# Patient Record
Sex: FEMALE | Race: WHITE | NOT HISPANIC OR LATINO | URBAN - METROPOLITAN AREA
[De-identification: names, ages, dates, MRNs, and addresses within clinical notes are randomized per-mention and may not be internally consistent; named-entity substitution may affect disease eponyms.]

---

## 2021-02-13 DIAGNOSIS — Z23 ENCOUNTER FOR IMMUNIZATION: ICD-10-CM

## 2021-02-17 ENCOUNTER — IMMUNIZATIONS (OUTPATIENT)
Dept: FAMILY MEDICINE CLINIC | Facility: HOSPITAL | Age: 76
End: 2021-02-17

## 2021-02-17 DIAGNOSIS — Z23 ENCOUNTER FOR IMMUNIZATION: Primary | ICD-10-CM

## 2021-02-17 PROCEDURE — 91300 SARS-COV-2 / COVID-19 MRNA VACCINE (PFIZER-BIONTECH) 30 MCG: CPT

## 2021-02-17 PROCEDURE — 0001A SARS-COV-2 / COVID-19 MRNA VACCINE (PFIZER-BIONTECH) 30 MCG: CPT

## 2021-03-10 ENCOUNTER — IMMUNIZATIONS (OUTPATIENT)
Dept: FAMILY MEDICINE CLINIC | Facility: HOSPITAL | Age: 76
End: 2021-03-10

## 2021-03-10 DIAGNOSIS — Z23 ENCOUNTER FOR IMMUNIZATION: Primary | ICD-10-CM

## 2021-03-10 PROCEDURE — 91300 SARS-COV-2 / COVID-19 MRNA VACCINE (PFIZER-BIONTECH) 30 MCG: CPT

## 2021-03-10 PROCEDURE — 0002A SARS-COV-2 / COVID-19 MRNA VACCINE (PFIZER-BIONTECH) 30 MCG: CPT

## 2022-12-26 ENCOUNTER — OFFICE VISIT (OUTPATIENT)
Dept: URGENT CARE | Facility: CLINIC | Age: 77
End: 2022-12-26

## 2022-12-26 VITALS — OXYGEN SATURATION: 98 % | HEART RATE: 91 BPM | RESPIRATION RATE: 20 BRPM | TEMPERATURE: 97.9 F

## 2022-12-26 DIAGNOSIS — H10.13 ALLERGIC CONJUNCTIVITIS OF BOTH EYES: Primary | ICD-10-CM

## 2022-12-26 RX ORDER — KETOTIFEN FUMARATE 0.35 MG/ML
1 SOLUTION/ DROPS OPHTHALMIC 2 TIMES DAILY
Qty: 5 ML | Refills: 0 | Status: SHIPPED | OUTPATIENT
Start: 2022-12-26

## 2022-12-26 RX ORDER — ALLOPURINOL 100 MG/1
100 TABLET ORAL 2 TIMES DAILY
COMMUNITY
Start: 2022-12-02 | End: 2023-12-02

## 2022-12-26 RX ORDER — COLCHICINE 0.6 MG/1
0.6 TABLET ORAL
COMMUNITY
Start: 2022-12-02 | End: 2023-12-02

## 2022-12-26 RX ORDER — ROSUVASTATIN CALCIUM 20 MG/1
20 TABLET, COATED ORAL DAILY
COMMUNITY

## 2022-12-26 RX ORDER — FERROUS SULFATE 325(65) MG
1 TABLET ORAL
COMMUNITY
Start: 2022-08-02 | End: 2023-08-02

## 2022-12-26 RX ORDER — DIMENHYDRINATE 50 MG
TABLET ORAL
COMMUNITY

## 2022-12-26 RX ORDER — ASPIRIN 81 MG/1
81 TABLET ORAL DAILY
COMMUNITY

## 2022-12-26 RX ORDER — LEVOTHYROXINE SODIUM 112 UG/1
112 TABLET ORAL DAILY
COMMUNITY
Start: 2022-08-24

## 2022-12-26 RX ORDER — VALSARTAN AND HYDROCHLOROTHIAZIDE 320; 25 MG/1; MG/1
1 TABLET, FILM COATED ORAL DAILY
COMMUNITY
Start: 2022-09-29

## 2022-12-26 RX ORDER — EXENATIDE 2 MG/.85ML
INJECTION, SUSPENSION, EXTENDED RELEASE SUBCUTANEOUS
COMMUNITY
Start: 2022-12-05

## 2022-12-26 NOTE — PROGRESS NOTES
[facility]        NAME: Shira Mitchell is a 69 y o  female  : 1945    MRN: [id number]  DATE: 2022  TIME: 3:47 PM    Assessment and Plan   Allergic conjunctivitis of both eyes [H10 13]  1  Allergic conjunctivitis of both eyes  ketotifen (ZADITOR) 0 025 % ophthalmic solution            Patient Instructions     Zaditor eye drops given for allergic conjunctivitis  Also recommend rewetting eye drops  No signs of infection today  Avoid rubbing eye and wash hands frequently  Follow-up with eye dr in the next 3-5 days if no improvement  Go to the ED if symptoms severely worsen  Chief Complaint     Chief Complaint   Patient presents with   • Eye Problem     States 2 weeks ago saw PCP with swelling of eyes with discharge, Gave Polytrim  Didn't help  Doctor told her to stop drops  Did not clear up  Using hot compresses  Sent a message on My Chart to PCP  on  who said someone from an eye doctor's office would be calling her but has not received a call  Called to several eye doctor's today but no one is open  History of Present Illness     Shira Mitchell is a 69 y o  female presenting to the office today for eye itching  Symptoms have been present for 14 days, and include itchy eyes with occasional discharge  She has tried Polytrim eye drops for her symptoms, with no relief  Notes it has gotten better with time  Review of Systems     Review of Systems   Constitutional: Negative for chills, fatigue and fever  HENT: Negative for congestion, ear discharge, ear pain, postnasal drip, sinus pressure, sinus pain and sore throat  Eyes: Positive for discharge and itching  Negative for pain and redness  Respiratory: Negative for cough and shortness of breath  Cardiovascular: Negative for chest pain and palpitations  Gastrointestinal: Negative for abdominal pain, diarrhea, nausea and vomiting  Genitourinary: Negative for difficulty urinating and dysuria     Musculoskeletal: Negative for arthralgias and myalgias  Skin: Negative for rash  Neurological: Negative for dizziness, syncope, light-headedness, numbness and headaches  Psychiatric/Behavioral: Negative for agitation  All other systems reviewed and are negative  Current Medications       Current Outpatient Medications:   •  allopurinol (ZYLOPRIM) 100 mg tablet, Take 100 mg by mouth 2 (two) times a day, Disp: , Rfl:   •  ascorbic acid (VITAMIN C) 1000 MG tablet, Take 1,000 mg by mouth daily, Disp: , Rfl:   •  aspirin (ECOTRIN LOW STRENGTH) 81 mg EC tablet, Take 81 mg by mouth daily, Disp: , Rfl:   •  BIOTIN PO, Take 1 capsule by mouth, Disp: , Rfl:   •  Bydureon BCise 2 MG/0 85ML AUIJ, INJECT 2MG UNDER THE SKIN WEEKLY, Disp: , Rfl:   •  Cholecalciferol 25 MCG (1000 UT) tablet, Take 2 tablets by mouth, Disp: , Rfl:   •  colchicine (COLCRYS) 0 6 mg tablet, Take 0 6 mg by mouth, Disp: , Rfl:   •  ferrous sulfate 325 (65 Fe) mg tablet, Take 1 tablet by mouth daily with breakfast, Disp: , Rfl:   •  Flaxseed, Linseed, (Flax Seed Oil) 1000 MG CAPS, Take by mouth, Disp: , Rfl:   •  ketotifen (ZADITOR) 0 025 % ophthalmic solution, Administer 1 drop to both eyes 2 (two) times a day, Disp: 5 mL, Rfl: 0  •  levothyroxine 112 mcg tablet, Take 112 mcg by mouth daily, Disp: , Rfl:   •  Omega-3 Fatty Acids (FISH OIL OMEGA-3 PO), Take by mouth, Disp: , Rfl:   •  rosuvastatin (CRESTOR) 20 MG tablet, Take 20 mg by mouth daily, Disp: , Rfl:   •  valsartan-hydrochlorothiazide (DIOVAN-HCT) 320-25 MG per tablet, Take 1 tablet by mouth daily, Disp: , Rfl:     Current Allergies     Allergies as of 12/26/2022   • (No Known Allergies)            The following portions of the patient's history were reviewed and updated as appropriate: allergies, current medications, past family history, past medical history, past social history, past surgical history and problem list      No past medical history on file  No past surgical history on file      No family history on file  Medications have been verified  Objective     Pulse 91   Temp 97 9 °F (36 6 °C) (Temporal)   Resp 20   SpO2 98%   No LMP recorded  Physical Exam     Physical Exam  Vitals reviewed  Constitutional:       General: She is not in acute distress  Appearance: Normal appearance  She is not ill-appearing  HENT:      Head: Normocephalic and atraumatic  Eyes:      General: Lids are normal  Vision grossly intact  Right eye: No discharge or hordeolum  Left eye: No discharge or hordeolum  Extraocular Movements: Extraocular movements intact  Conjunctiva/sclera: Conjunctivae normal       Right eye: No exudate  Left eye: No exudate  Pulmonary:      Effort: Pulmonary effort is normal  No respiratory distress  Musculoskeletal:      Cervical back: Normal range of motion and neck supple  No tenderness  Skin:     General: Skin is warm  Neurological:      General: No focal deficit present  Mental Status: She is alert     Psychiatric:         Mood and Affect: Mood normal          Behavior: Behavior normal

## 2024-04-28 ENCOUNTER — OFFICE VISIT (OUTPATIENT)
Dept: URGENT CARE | Facility: CLINIC | Age: 79
End: 2024-04-28
Payer: MEDICARE

## 2024-04-28 VITALS
OXYGEN SATURATION: 99 % | DIASTOLIC BLOOD PRESSURE: 65 MMHG | WEIGHT: 170 LBS | HEART RATE: 77 BPM | SYSTOLIC BLOOD PRESSURE: 154 MMHG | TEMPERATURE: 97.9 F | RESPIRATION RATE: 14 BRPM | HEIGHT: 63 IN | BODY MASS INDEX: 30.12 KG/M2

## 2024-04-28 DIAGNOSIS — L29.9 ITCHING: Primary | ICD-10-CM

## 2024-04-28 DIAGNOSIS — T50.905A MEDICATION REACTION, INITIAL ENCOUNTER: ICD-10-CM

## 2024-04-28 PROBLEM — E11.9 DIABETES MELLITUS (HCC): Chronic | Status: ACTIVE | Noted: 2021-08-13

## 2024-04-28 PROBLEM — N17.0 ACUTE RENAL FAILURE WITH TUBULAR NECROSIS (HCC): Status: ACTIVE | Noted: 2024-03-01

## 2024-04-28 PROBLEM — M1A.0710 IDIOPATHIC CHRONIC GOUT OF RIGHT FOOT WITHOUT TOPHUS: Chronic | Status: ACTIVE | Noted: 2021-05-06

## 2024-04-28 PROBLEM — N18.32 STAGE 3B CHRONIC KIDNEY DISEASE (HCC): Chronic | Status: ACTIVE | Noted: 2021-08-31

## 2024-04-28 PROBLEM — I48.91 ATRIAL FIBRILLATION (HCC): Chronic | Status: ACTIVE | Noted: 2024-03-02

## 2024-04-28 PROBLEM — D50.9 IRON DEFICIENCY ANEMIA: Status: ACTIVE | Noted: 2021-05-30

## 2024-04-28 PROBLEM — K26.5 PERFORATED DUODENAL ULCER (HCC): Status: ACTIVE | Noted: 2024-03-27

## 2024-04-28 PROBLEM — C50.412 MALIGNANT NEOPLASM OF UPPER-OUTER QUADRANT OF LEFT BREAST IN FEMALE, ESTROGEN RECEPTOR POSITIVE (HCC): Chronic | Status: ACTIVE | Noted: 2023-10-27

## 2024-04-28 PROBLEM — D69.6 THROMBOCYTOPENIA (HCC): Status: ACTIVE | Noted: 2024-03-01

## 2024-04-28 PROBLEM — Z17.0 MALIGNANT NEOPLASM OF UPPER-OUTER QUADRANT OF LEFT BREAST IN FEMALE, ESTROGEN RECEPTOR POSITIVE (HCC): Chronic | Status: ACTIVE | Noted: 2023-10-27

## 2024-04-28 PROCEDURE — G0463 HOSPITAL OUTPT CLINIC VISIT: HCPCS | Performed by: PHYSICIAN ASSISTANT

## 2024-04-28 PROCEDURE — 99215 OFFICE O/P EST HI 40 MIN: CPT | Performed by: PHYSICIAN ASSISTANT

## 2024-04-28 RX ORDER — FOLIC ACID 1 MG/1
1 TABLET ORAL DAILY
COMMUNITY
Start: 2024-03-30 | End: 2024-06-28

## 2024-04-28 RX ORDER — INSULIN GLARGINE 100 [IU]/ML
INJECTION, SOLUTION SUBCUTANEOUS DAILY
COMMUNITY
Start: 2024-02-16

## 2024-04-28 RX ORDER — PROCHLORPERAZINE MALEATE 10 MG
1 TABLET ORAL EVERY 6 HOURS PRN
COMMUNITY
Start: 2024-02-15

## 2024-04-28 RX ORDER — LATANOPROST 50 UG/ML
1 SOLUTION/ DROPS OPHTHALMIC
COMMUNITY
Start: 2023-12-07

## 2024-04-28 RX ORDER — LEVOTHYROXINE SODIUM 0.1 MG/1
100 TABLET ORAL DAILY
COMMUNITY
Start: 2024-04-16

## 2024-04-28 RX ORDER — TORSEMIDE 20 MG/1
20 TABLET ORAL DAILY
COMMUNITY
Start: 2024-03-31 | End: 2025-03-31

## 2024-04-28 RX ORDER — HYDROCHLOROTHIAZIDE 25 MG/1
TABLET ORAL
COMMUNITY
Start: 2024-01-05

## 2024-04-28 RX ORDER — LETROZOLE 2.5 MG/1
2.5 TABLET, FILM COATED ORAL DAILY
COMMUNITY
Start: 2024-04-17

## 2024-04-28 RX ORDER — PANTOPRAZOLE SODIUM 40 MG/1
40 TABLET, DELAYED RELEASE ORAL 2 TIMES DAILY
COMMUNITY
Start: 2024-04-15 | End: 2025-04-10

## 2024-04-28 RX ORDER — POTASSIUM CHLORIDE 750 MG/1
10 TABLET, EXTENDED RELEASE ORAL DAILY
COMMUNITY
Start: 2024-03-30 | End: 2025-03-30

## 2024-04-28 NOTE — PROGRESS NOTES
[facility]        NAME: Nessa Bautista is a 78 y.o. female  : 1945    MRN: [id number]  DATE: 2024  TIME: 9:00 AM    Assessment and Plan   Itching [L29.9]  1. Itching        2. Medication reaction, initial encounter        Patient presents with itching likely secondary to pulmonary medications that she recently started.  I have done some research through medication databases and found that pruritus or itching is a common side effect for metoprolol.  Discussed stopping metoprolol.  Patient is instructed that she needs to keep close monitor on her blood pressure and her heart rate.  We discussed checking heart rate via setting a 1 minute time on the phone and counting.  She is to follow-up with her family doctor's office first thing in the morning regarding today's visit.  We also discussed that if she has a heart rate that goes above 100 or she experiences any chest pain, shortness of breath or palpitation she should report to the emergency room immediately.      Patient Instructions     Patient Instructions   Discontinue Metoprolol.   Monitor blood pressure and heart rate 2-3 times a day.   Zyrtec, Claritin, or Benadryl as needed for itching.   Follow-up with PCP office tomorrow.   If you develop a heart rate > 100 or have palpitations or chest pain, report to the emergency department immediately.       Follow up with PCP in 3-5 days.  Proceed to  ER if symptoms worsen.    Chief Complaint     Chief Complaint   Patient presents with    Itching     Pt reports of worsening generalized itching, possible medication issue.          History of Present Illness       71 year old female presents with complaint of itching x 2 weeks. Pt reports she was hospitalized for the entire month of March and discharged on several new medications including folic acid, Trajenta, Klorcon, Metoprolol, Torsemide, and Pantoprazole. She discussed her symptoms with her endocrinologist who recommended she stop the Trajenta  which she did nearly 5 days ago and is still experiencing the itching.         Review of Systems   Review of Systems   HENT:  Negative for sore throat.    Respiratory:  Negative for cough, shortness of breath and wheezing.    Cardiovascular:  Negative for chest pain.   Gastrointestinal:  Negative for diarrhea.   Skin:  Negative for rash.         Current Medications       Current Outpatient Medications:     folic acid (FOLVITE) 1 mg tablet, Take 1 mg by mouth daily, Disp: , Rfl:     hydroCHLOROthiazide 25 mg tablet, TAKE 1 TABLET(25 MG) BY MOUTH EVERY DAY, Disp: , Rfl:     Lantus SoloStar 100 units/mL SOPN, Inject under the skin daily, Disp: , Rfl:     latanoprost (XALATAN) 0.005 % ophthalmic solution, 1 drop, Disp: , Rfl:     letrozole (FEMARA) 2.5 mg tablet, Take 2.5 mg by mouth daily, Disp: , Rfl:     levothyroxine 100 mcg tablet, Take 100 mcg by mouth daily, Disp: , Rfl:     linaGLIPtin 5 MG TABS, Take 5 mg by mouth daily, Disp: , Rfl:     metoprolol tartrate (LOPRESSOR) 25 mg tablet, Take 25 mg by mouth 2 (two) times a day, Disp: , Rfl:     pantoprazole (PROTONIX) 40 mg tablet, Take 40 mg by mouth 2 (two) times a day, Disp: , Rfl:     potassium chloride (Klor-Con M10) 10 mEq tablet, Take 10 mEq by mouth daily, Disp: , Rfl:     prochlorperazine (COMPAZINE) 10 mg tablet, Take 1 tablet by mouth every 6 (six) hours as needed, Disp: , Rfl:     torsemide (DEMADEX) 20 mg tablet, Take 20 mg by mouth daily, Disp: , Rfl:     allopurinol (ZYLOPRIM) 100 mg tablet, Take 100 mg by mouth 2 (two) times a day, Disp: , Rfl:     ascorbic acid (VITAMIN C) 1000 MG tablet, Take 1,000 mg by mouth daily, Disp: , Rfl:     aspirin (ECOTRIN LOW STRENGTH) 81 mg EC tablet, Take 81 mg by mouth daily, Disp: , Rfl:     BIOTIN PO, Take 1 capsule by mouth, Disp: , Rfl:     Bydureon BCise 2 MG/0.85ML AUIJ, INJECT 2MG UNDER THE SKIN WEEKLY, Disp: , Rfl:     Cholecalciferol 25 MCG (1000 UT) tablet, Take 2 tablets by mouth, Disp: , Rfl:      "colchicine (COLCRYS) 0.6 mg tablet, Take 0.6 mg by mouth, Disp: , Rfl:     ferrous sulfate 325 (65 Fe) mg tablet, Take 1 tablet by mouth daily with breakfast, Disp: , Rfl:     Flaxseed, Linseed, (Flax Seed Oil) 1000 MG CAPS, Take by mouth, Disp: , Rfl:     ketotifen (ZADITOR) 0.025 % ophthalmic solution, Administer 1 drop to both eyes 2 (two) times a day, Disp: 5 mL, Rfl: 0    Omega-3 Fatty Acids (FISH OIL OMEGA-3 PO), Take by mouth, Disp: , Rfl:     rosuvastatin (CRESTOR) 20 MG tablet, Take 20 mg by mouth daily, Disp: , Rfl:     valsartan-hydrochlorothiazide (DIOVAN-HCT) 320-25 MG per tablet, Take 1 tablet by mouth daily, Disp: , Rfl:     Current Allergies     Allergies as of 04/28/2024    (No Known Allergies)            The following portions of the patient's history were reviewed and updated as appropriate: allergies, current medications, past family history, past medical history, past social history, past surgical history and problem list.     Past Medical History:   Diagnosis Date    Cancer (HCC)        Past Surgical History:   Procedure Laterality Date    BREAST LUMPECTOMY      MAMMO NEEDLE LOCALIZATION LEFT (ALL INC) Left 01/02/2024       History reviewed. No pertinent family history.      Medications have been verified.        Objective   /65   Pulse 77   Temp 97.9 °F (36.6 °C)   Resp 14   Ht 5' 3\" (1.6 m)   Wt 77.1 kg (170 lb)   SpO2 99%   BMI 30.11 kg/m²   No LMP recorded.       Physical Exam     Physical Exam  Vitals and nursing note reviewed.   Constitutional:       General: She is awake. She is not in acute distress.     Appearance: Normal appearance. She is well-developed and well-groomed. She is not ill-appearing, toxic-appearing or diaphoretic.   HENT:      Head: Normocephalic and atraumatic.      Right Ear: Hearing and external ear normal.      Left Ear: Hearing and external ear normal.      Mouth/Throat:      Lips: Pink. No lesions.      Mouth: Mucous membranes are moist. No injury.    " "  Tongue: No lesions. Tongue does not deviate from midline.      Palate: No mass and lesions.      Pharynx: Oropharynx is clear. Uvula midline.   Eyes:      General: Lids are normal. Vision grossly intact. Gaze aligned appropriately.   Cardiovascular:      Rate and Rhythm: Normal rate and regular rhythm.      Heart sounds: Normal heart sounds, S1 normal and S2 normal. Heart sounds not distant. No murmur heard.     No friction rub. No gallop.   Pulmonary:      Effort: Pulmonary effort is normal.      Breath sounds: Normal breath sounds. No decreased breath sounds, wheezing, rhonchi or rales.      Comments: Patient is speaking in full sentences with no increased respiratory effort. No audible wheezing or stridor.   Musculoskeletal:      Cervical back: Normal range of motion.   Skin:     General: Skin is warm and dry.   Neurological:      Mental Status: She is alert and oriented to person, place, and time.      Coordination: Coordination is intact.      Gait: Gait is intact.   Psychiatric:         Attention and Perception: Attention and perception normal.         Mood and Affect: Mood and affect normal.         Speech: Speech normal.         Behavior: Behavior normal. Behavior is cooperative.               Note: Portions of this record may have been created with voice recognition software. Occasional wrong word or \"sound a like\" substitutions may have occurred due to the inherent limitations of voice recognition software. Please read the chart carefully and recognize, using context, where substitutions have occurred.*      "

## 2024-04-28 NOTE — PATIENT INSTRUCTIONS
Discontinue Metoprolol.   Monitor blood pressure and heart rate 2-3 times a day.   Zyrtec, Claritin, or Benadryl as needed for itching.   Follow-up with PCP office tomorrow.   If you develop a heart rate > 100 or have palpitations or chest pain, report to the emergency department immediately.

## 2025-02-28 ENCOUNTER — OFFICE VISIT (OUTPATIENT)
Dept: URGENT CARE | Facility: CLINIC | Age: 80
End: 2025-02-28
Payer: MEDICARE

## 2025-02-28 ENCOUNTER — APPOINTMENT (OUTPATIENT)
Dept: RADIOLOGY | Facility: CLINIC | Age: 80
End: 2025-02-28
Payer: MEDICARE

## 2025-02-28 VITALS
RESPIRATION RATE: 18 BRPM | HEART RATE: 74 BPM | DIASTOLIC BLOOD PRESSURE: 55 MMHG | TEMPERATURE: 98.6 F | OXYGEN SATURATION: 95 % | SYSTOLIC BLOOD PRESSURE: 130 MMHG

## 2025-02-28 DIAGNOSIS — M79.645 FINGER PAIN, LEFT: ICD-10-CM

## 2025-02-28 DIAGNOSIS — S66.519A STRAIN OF INTRINSIC MUSCLE OF FINGER: Primary | ICD-10-CM

## 2025-02-28 PROCEDURE — G0463 HOSPITAL OUTPT CLINIC VISIT: HCPCS | Performed by: NURSE PRACTITIONER

## 2025-02-28 PROCEDURE — 73140 X-RAY EXAM OF FINGER(S): CPT

## 2025-02-28 PROCEDURE — 99213 OFFICE O/P EST LOW 20 MIN: CPT | Performed by: NURSE PRACTITIONER

## 2025-02-28 RX ORDER — EXEMESTANE 25 MG/1
25 TABLET ORAL DAILY
COMMUNITY

## 2025-02-28 NOTE — PATIENT INSTRUCTIONS
X-ray of right middle finger negative for fracture - official read pending   Trial OTC voltaren or motrin gel q6hrs x 96 hrs for antiinflammatory properties   Warm epsom salt baths PRN   Gentle daily stretches   F/u with PCP if no resolution or improvement within 1-2 weeks with interventions  Proceed to the ER should symptoms worsen

## 2025-02-28 NOTE — PROGRESS NOTES
[facility]  Name: Nessa Bautista      : 1945      MRN: [id number]  Encounter Provider: PANDA Veras  Encounter Date: 2025   Encounter department: [facility]  :  Assessment & Plan  Strain of intrinsic muscle of finger           Patient Instructions  X-ray of right middle finger negative for fracture - official read pending   Trial OTC voltaren or motrin gel q6hrs x 96 hrs for antiinflammatory properties   Warm epsom salt baths PRN   Gentle daily stretches   F/u with PCP if no resolution or improvement within 1-2 weeks with interventions  Proceed to the ER should symptoms worsen     If tests are performed, our office will contact you with results only if changes need to made to the care plan discussed with you at the visit. [facility]    Chief Complaint:   Chief Complaint   Patient presents with    Hand Pain     Left middle finger pain for a month. Having pain when pressure is applied and cannot bend it.     History of Present Illness   78 y/o female with PMH of gout on allopurinol presents for right middle finger discomfort x 1 month. Patient denies any injuries or trauma to her finger. She feels that the pain is worse when she is trying to write with a pen. No swelling, redness or bruising.       History obtained from: patient    Review of Systems   Constitutional: Negative.    HENT: Negative.     Eyes: Negative.    Respiratory: Negative.     Cardiovascular: Negative.    Gastrointestinal: Negative.    Endocrine: Negative.    Genitourinary: Negative.    Musculoskeletal:  Positive for arthralgias and myalgias.   Skin: Negative.    Allergic/Immunologic: Negative.    Neurological: Negative.    Hematological: Negative.    Psychiatric/Behavioral: Negative.       Past Medical History   Past Medical History:   Diagnosis Date    Cancer (HCC)      Past Surgical History:   Procedure Laterality Date    BREAST LUMPECTOMY       MAMMO NEEDLE LOCALIZATION LEFT (ALL INC) Left 1/2/2024     No family history on file.  she   Current Outpatient Medications   Medication Instructions    allopurinol (ZYLOPRIM) 100 mg, 2 times daily    ascorbic acid (VITAMIN C) 1,000 mg, Daily    aspirin (ECOTRIN LOW STRENGTH) 81 mg, Daily    BIOTIN PO 1 capsule    Bydureon BCise 2 MG/0.85ML AUIJ INJECT 2MG UNDER THE SKIN WEEKLY    Cholecalciferol 25 MCG (1000 UT) tablet 2 tablets    colchicine (COLCRYS) 0.6 mg, Oral    exemestane (AROMASIN) 25 mg, Daily    ferrous sulfate 325 (65 Fe) mg tablet 1 tablet, Oral, Daily with breakfast    Flaxseed, Linseed, (Flax Seed Oil) 1000 MG CAPS Take by mouth    folic acid (FOLVITE) 1 mg, Oral, Daily    hydroCHLOROthiazide 25 mg tablet TAKE 1 TABLET(25 MG) BY MOUTH EVERY DAY    ketotifen (ZADITOR) 0.025 % ophthalmic solution 1 drop, Both Eyes, 2 times daily    Lantus SoloStar 100 units/mL SOPN Daily    latanoprost (XALATAN) 0.005 % ophthalmic solution 1 drop    letrozole (FEMARA) 2.5 mg, Daily    levothyroxine 100 mcg, Oral, Daily    linaGLIPtin 5 mg, Oral, Daily    metoprolol tartrate (LOPRESSOR) 25 mg, Oral, 2 times daily    Omega-3 Fatty Acids (FISH OIL OMEGA-3 PO) Take by mouth    pantoprazole (PROTONIX) 40 mg, 2 times daily    potassium chloride (Klor-Con M10) 10 mEq tablet 10 mEq, Daily    prochlorperazine (COMPAZINE) 10 mg tablet 1 tablet, Oral, Every 6 hours PRN    rosuvastatin (CRESTOR) 20 mg, Daily    torsemide (DEMADEX) 20 mg, Daily    valsartan-hydrochlorothiazide (DIOVAN-HCT) 320-25 MG per tablet 1 tablet, Daily   No Known Allergies   Objective   /55 (BP Location: Left arm, Patient Position: Sitting, Cuff Size: Standard)   Pulse 74   Temp 98.6 °F (37 °C) (Tympanic Core)   Resp 18   SpO2 95%      Physical Exam  Constitutional:       Appearance: Normal appearance.   HENT:      Head: Normocephalic and atraumatic.      Right Ear: External ear normal.      Left Ear: External ear normal.      Nose: Nose normal.       Mouth/Throat:      Mouth: Mucous membranes are moist.      Pharynx: Oropharynx is clear.   Eyes:      Conjunctiva/sclera: Conjunctivae normal.   Cardiovascular:      Rate and Rhythm: Normal rate and regular rhythm.      Pulses: Normal pulses.   Pulmonary:      Effort: Pulmonary effort is normal.   Abdominal:      Palpations: Abdomen is soft.   Musculoskeletal:         General: Normal range of motion.      Right hand: Normal.      Cervical back: Normal range of motion.      Comments: Right middle finger tenderness, worse with bending. No redness, swelling, bruising noted.    Skin:     General: Skin is warm and dry.      Capillary Refill: Capillary refill takes less than 2 seconds.   Neurological:      General: No focal deficit present.      Mental Status: She is alert and oriented to person, place, and time.   Psychiatric:         Behavior: Behavior normal.         Thought Content: Thought content normal.